# Patient Record
Sex: MALE
[De-identification: names, ages, dates, MRNs, and addresses within clinical notes are randomized per-mention and may not be internally consistent; named-entity substitution may affect disease eponyms.]

---

## 2020-06-29 ENCOUNTER — APPOINTMENT (OUTPATIENT)
Dept: UROLOGY | Facility: CLINIC | Age: 35
End: 2020-06-29
Payer: MEDICAID

## 2020-06-29 VITALS
WEIGHT: 175 LBS | TEMPERATURE: 98.1 F | BODY MASS INDEX: 25.05 KG/M2 | SYSTOLIC BLOOD PRESSURE: 135 MMHG | HEIGHT: 70 IN | DIASTOLIC BLOOD PRESSURE: 70 MMHG

## 2020-06-29 DIAGNOSIS — Z85.831 PERSONAL HISTORY OF MALIGNANT NEOPLASM OF SOFT TISSUE: ICD-10-CM

## 2020-06-29 PROCEDURE — 99203 OFFICE O/P NEW LOW 30 MIN: CPT

## 2020-06-29 RX ORDER — AMOXICILLIN 875 MG/1
875 TABLET, FILM COATED ORAL
Qty: 14 | Refills: 0 | Status: DISCONTINUED | COMMUNITY
Start: 2020-06-03

## 2020-06-29 NOTE — HISTORY OF PRESENT ILLNESS
[None] : no symptoms [FreeTextEntry1] : Patient Joseluis Sandoval referred by Parmjit Boudreaux\par Job: principal in school\par Partner Name: Jocelynn \par Partner Age: 26\par Prior marriage: none\par Prior Pregnancy none \par Duration of Relationship: 1 year\par Years unprotected sexual intercourse: Gnosticism ritual\par Time [place]: \par Sexual dysfunction:none\par Artificial lubricants:  none\par Exposure history: no tobacco; occassional ETOH\par  [Erectile Dysfunction] : no Erectile Dysfunction

## 2020-06-29 NOTE — LETTER BODY
[FreeTextEntry1] : Dear Dr Boudreaux,\par \par \par Thank you for your kind referral.  I am enclosing a copy of my office note for your information.\par \par I will keep you informed of any developments.\par \par Feel free to contact me if you have any questions.\par \par Sincerely,\par \par Norman Cervantes MD, FACS\par Professor of Urology\par [facility] [employer]\par \par [address] [facility]\par [place], [place]  [address]\par \par [address]\par [place], [place] [address]\par \par Office Telephone \par [phone]\par [phone]\par  [FreeTextEntry2] : Parmjit Boudreaux MD\par [address]\par [place], [place] [address]\par

## 2020-06-29 NOTE — PHYSICAL EXAM
[General Appearance - Well Developed] : well developed [Edema] : no peripheral edema [Exaggerated Use Of Accessory Muscles For Inspiration] : no accessory muscle use [Abdomen Soft] : soft [Abdomen Tenderness] : non-tender [Abdomen Hernia] : no hernia was discovered [Urethral Meatus] : meatus normal [Costovertebral Angle Tenderness] : no ~M costovertebral angle tenderness [Epididymis] : the epididymides were normal [Penis Abnormality] : normal circumcised penis [Normal Station and Gait] : the gait and station were normal for the patient's age [Testes Tenderness] : no tenderness of the testes [No Focal Deficits] : no focal deficits [Skin Color & Pigmentation] : normal skin color and pigmentation [Oriented To Time, Place, And Person] : oriented to person, place, and time [No Palpable Adenopathy] : no palpable adenopathy [FreeTextEntry1] : 3.5x 2 bilaterally; vas x 2; tight scrotum; probable varicocele bilaterally;

## 2020-06-29 NOTE — ASSESSMENT
[FreeTextEntry1] : 34 year old \par s/p chemotherapy for rhabdomyosarcoma\par virtual azoospermia x 2\par most recent at [facility] - 3 motile sperm\par ultrasound r/o varicocele\par repeat semen analysis  to continue cryoprservation for IVF\par Discussed rescue testicular sperm extraction risks of mTESE is that no sperm are found; surgical infection; loss testosterone\par \par will proceed with:\par endocrine evaluation\par ultrasound to r/o variocele in light of exam\par \par discussed genetic evaluation in light of virtual azoospermia\par discussed most likely the effect of prior chemotherapy by genetics prudent course\par patient understands and wishes to proceed\par \par \par \par

## 2020-06-30 ENCOUNTER — TRANSCRIPTION ENCOUNTER (OUTPATIENT)
Age: 35
End: 2020-06-30

## 2020-07-01 ENCOUNTER — TRANSCRIPTION ENCOUNTER (OUTPATIENT)
Age: 35
End: 2020-07-01

## 2020-07-01 LAB
ESTRADIOL SERPL-MCNC: 23 PG/ML
FSH SERPL-MCNC: 18.2 IU/L
LH SERPL-ACNC: 10.4 IU/L
TESTOST SERPL-MCNC: 499 NG/DL

## 2020-07-02 ENCOUNTER — OUTPATIENT (OUTPATIENT)
Dept: OUTPATIENT SERVICES | Facility: HOSPITAL | Age: 35
LOS: 1 days | End: 2020-07-02

## 2020-07-02 ENCOUNTER — RESULT REVIEW (OUTPATIENT)
Age: 35
End: 2020-07-02

## 2020-07-02 ENCOUNTER — APPOINTMENT (OUTPATIENT)
Dept: ULTRASOUND IMAGING | Facility: CLINIC | Age: 35
End: 2020-07-02
Payer: MEDICAID

## 2020-07-02 PROCEDURE — 93975 VASCULAR STUDY: CPT | Mod: 26

## 2020-07-02 PROCEDURE — 76870 US EXAM SCROTUM: CPT | Mod: 26

## 2020-07-03 ENCOUNTER — TRANSCRIPTION ENCOUNTER (OUTPATIENT)
Age: 35
End: 2020-07-03

## 2020-07-06 ENCOUNTER — TRANSCRIPTION ENCOUNTER (OUTPATIENT)
Age: 35
End: 2020-07-06

## 2020-07-08 LAB — Y CHROMOSOME MICRODELETION, DNA ANALYSIS: NORMAL

## 2020-07-10 ENCOUNTER — APPOINTMENT (OUTPATIENT)
Dept: UROLOGY | Facility: CLINIC | Age: 35
End: 2020-07-10
Payer: MEDICAID

## 2020-07-10 VITALS — TEMPERATURE: 98 F

## 2020-07-10 DIAGNOSIS — N46.01 ORGANIC AZOOSPERMIA: ICD-10-CM

## 2020-07-10 PROCEDURE — 99214 OFFICE O/P EST MOD 30 MIN: CPT

## 2020-07-10 NOTE — LETTER BODY
[FreeTextEntry2] : Parmjit Boudreaux MD\par Parmjit Boudreaux MD\par [facility]\par [address]\par [facility], [address]\par  [FreeTextEntry1] : Dear Dr Boudreaux,\par  \par Dear Dr Boudreaux\par \par \par I had the opportunity to see your patient, Mr. JORGE LUIS GAMINO in followup. I am enclosing my office note for your information.\par \par I will keep you informed of any developments.\par \par Feel free to contact me if you have any questions.\par \par Sincerely,\par \par Norman Cervantes MD, FACS\par Professor of Urology\par [facility]\par \par [address] [facility]\par [place], [place]  [address]\par \par [address]\par [place], [place] [address]\par \par Office Telephone \par [phone]\par [phone]\par

## 2020-07-10 NOTE — ASSESSMENT
[FreeTextEntry1] : 34 year old \par s/p chemotherapy for rhabdomyosarcoma\par virtual azoospermia x 2\par most recent at [facility] - 3 motile sperm\par ultrasound r/o varicocele\par repeat semen analysis  to continue cryoprservation for IVF\par Discussed rescue testicular sperm extraction risks of mTESE is that no sperm are found; surgical infection; loss testosterone\par \par will proceed with:\par endocrine evaluation\par ultrasound to r/o variocele in light of exam\par \par discussed genetic evaluation in light of virtual azoospermia\par discussed most likely the effect of prior chemotherapy by genetics prudent course\par patient understands and wishes to proceed\par \par \par 7.10.2020\par \par ultrasound right testicle 15cc; left testicle 10 cc; left varicocele ? borderline right\par endocrine elevated FSH \par normal T and high normal LH\par microdeletion negative\par KARYTOTYPE pending\par proceeding with IVF\par The findings of a varicocele were discussed with the patient.\par  Discussed indications for varicocelectomy:\par 1, fertility,\par 2. ? development of hypogonadism\par 3. Cosmesis\par 4. Pain\par The findings of a varicocele were discussed with  JORGE LUIS GAMINO.\par The pathophysiology was discussed.\par The surgical procedure of microsurgical varicocelectomy was fully discussed. Risks and complications were reviewed including but not limited to: 1. recurrence, 2. chronic testicular pain/discomfort, 3. hydrocele (temporary or permanent), 4. testicular injury or atrophy, 5 vas or nerve injury, 6. infection, 7. bleeding ,  8. general surgical and anesthetic risks etc.\par We discussed response rates with regards to semen parameters and pregnancy rates. We discussed the fact that poor prognosis and certain need for IVF but allowing for sperm to be available more reliably IVF.\par Goal to increase IVF ease, avoid deterioration; avoid need for testicular sperm extraction\par Postoperative course was described in detail.\par All questions of the patient were answered. \par RECOMMEND repeat SA before IVF to assure reliable availability of sperm\par \par

## 2020-07-10 NOTE — HISTORY OF PRESENT ILLNESS
[FreeTextEntry1] : Patient Joseluis Sandoval referred by Parmjit Boudreaux\par Job: principal in school\par Partner Name: Jocelynn \par Partner Age: 26\par Prior marriage: none\par Prior Pregnancy none \par Duration of Relationship: 1 year\par Years unprotected sexual intercourse: Religion ritual\par Time [place]: \par Sexual dysfunction:none\par Artificial lubricants:  none\par Exposure history: no tobacco; occassional ETOH\par \par 7.10.2020 returns for review of results and management

## 2020-07-13 ENCOUNTER — TRANSCRIPTION ENCOUNTER (OUTPATIENT)
Age: 35
End: 2020-07-13

## 2021-06-21 ENCOUNTER — TRANSCRIPTION ENCOUNTER (OUTPATIENT)
Age: 36
End: 2021-06-21

## 2024-11-05 ENCOUNTER — NON-APPOINTMENT (OUTPATIENT)
Age: 39
End: 2024-11-05

## 2024-11-06 DIAGNOSIS — I35.0 NONRHEUMATIC AORTIC (VALVE) STENOSIS: ICD-10-CM

## 2024-11-12 ENCOUNTER — NON-APPOINTMENT (OUTPATIENT)
Age: 39
End: 2024-11-12

## 2024-11-12 ENCOUNTER — APPOINTMENT (OUTPATIENT)
Dept: HEART AND VASCULAR | Facility: CLINIC | Age: 39
End: 2024-11-12
Payer: MEDICAID

## 2024-11-12 VITALS
BODY MASS INDEX: 24.34 KG/M2 | DIASTOLIC BLOOD PRESSURE: 75 MMHG | HEART RATE: 58 BPM | WEIGHT: 170 LBS | SYSTOLIC BLOOD PRESSURE: 122 MMHG | HEIGHT: 70 IN

## 2024-11-12 DIAGNOSIS — Z00.00 ENCOUNTER FOR GENERAL ADULT MEDICAL EXAMINATION W/OUT ABNORMAL FINDINGS: ICD-10-CM

## 2024-11-12 DIAGNOSIS — I77.810 THORACIC AORTIC ECTASIA: ICD-10-CM

## 2024-11-12 DIAGNOSIS — Q23.81 BICUSPID AORTIC VALVE: ICD-10-CM

## 2024-11-12 PROCEDURE — 93306 TTE W/DOPPLER COMPLETE: CPT

## 2024-11-12 PROCEDURE — 99204 OFFICE O/P NEW MOD 45 MIN: CPT | Mod: 25

## 2024-11-12 PROCEDURE — 93000 ELECTROCARDIOGRAM COMPLETE: CPT
